# Patient Record
Sex: MALE
[De-identification: names, ages, dates, MRNs, and addresses within clinical notes are randomized per-mention and may not be internally consistent; named-entity substitution may affect disease eponyms.]

---

## 2023-02-23 PROBLEM — Z00.00 ENCOUNTER FOR PREVENTIVE HEALTH EXAMINATION: Status: ACTIVE | Noted: 2023-02-23

## 2023-03-02 ENCOUNTER — APPOINTMENT (OUTPATIENT)
Dept: PULMONOLOGY | Facility: CLINIC | Age: 68
End: 2023-03-02
Payer: MEDICARE

## 2023-03-02 VITALS
BODY MASS INDEX: 21.87 KG/M2 | WEIGHT: 141 LBS | RESPIRATION RATE: 14 BRPM | HEIGHT: 67.13 IN | TEMPERATURE: 97.8 F | HEART RATE: 78 BPM | OXYGEN SATURATION: 97 % | DIASTOLIC BLOOD PRESSURE: 75 MMHG | SYSTOLIC BLOOD PRESSURE: 126 MMHG

## 2023-03-02 DIAGNOSIS — J47.9 BRONCHIECTASIS, UNCOMPLICATED: ICD-10-CM

## 2023-03-02 DIAGNOSIS — Z87.891 PERSONAL HISTORY OF NICOTINE DEPENDENCE: ICD-10-CM

## 2023-03-02 PROCEDURE — 99203 OFFICE O/P NEW LOW 30 MIN: CPT

## 2023-03-02 NOTE — ASSESSMENT
[FreeTextEntry1] : I reviewed his chest CT which demonstrated a minimal amount of bronchiectasis in the middle lobe, lingula with a tiny calcified granuloma in the left midlung field. There was small amount of pleural scarring in the right middle lobe. I reviewed the findings with the daughter and with the patient. I explained if the dyspnea were to recur or work to be present under any circumstance, he should return for coronary functions and reevaluation. I cautioned him about developing a productive cough and the need to followup with us if that were to occur.

## 2023-03-02 NOTE — HISTORY OF PRESENT ILLNESS
[TextBox_4] : 68-year-old, Mandarin speaking male, accompanied by his daughter who translated. The patient has been in excellent health and not on any medications. He was lifting a positive with water and felt short of breath which lasted about one minute. Normally he can walk up one or 2 flights of stairs without dyspnea and on level ground for distance. He denies any cough or chest discomfort. He saw cardiologist who during a clinical investigation ordered a chest radiograph was followed by a CAT scan both of which were reported as abnormal. The patient has about a 10-pack-year history of cigarette smoking and has had no occupational exposures. He grew up in [place] and he denies any prior history of lung disease. He remembers having a CAT scan almost 30 years ago he was told there was a scar

## 2023-03-02 NOTE — PHYSICAL EXAM
[No Acute Distress] : no acute distress [Low Lying Soft Palate] : low lying soft palate [III] : Mallampati Class: III [Normal Appearance] : normal appearance [Normal Rate/Rhythm] : normal rate/rhythm [Normal S1, S2] : normal s1, s2 [No Resp Distress] : no resp distress [Clear to Auscultation Bilaterally] : clear to auscultation bilaterally [Normal Gait] : normal gait [No Clubbing] : no clubbing

## 2023-03-02 NOTE — REASON FOR VISIT
[Initial] : an initial visit [Abnormal CXR/ Chest CT] : an abnormal CXR/ chest CT [Family Member] : family member

## 2023-08-02 ENCOUNTER — APPOINTMENT (OUTPATIENT)
Dept: PULMONOLOGY | Facility: CLINIC | Age: 68
End: 2023-08-02